# Patient Record
Sex: FEMALE | Race: ASIAN
[De-identification: names, ages, dates, MRNs, and addresses within clinical notes are randomized per-mention and may not be internally consistent; named-entity substitution may affect disease eponyms.]

---

## 2020-07-17 ENCOUNTER — HOSPITAL ENCOUNTER (OUTPATIENT)
Dept: HOSPITAL 74 - JASU-SURG | Age: 50
Discharge: HOME | End: 2020-07-17
Attending: OBSTETRICS & GYNECOLOGY
Payer: COMMERCIAL

## 2020-07-17 VITALS — SYSTOLIC BLOOD PRESSURE: 145 MMHG | HEART RATE: 82 BPM | TEMPERATURE: 97.2 F | DIASTOLIC BLOOD PRESSURE: 50 MMHG

## 2020-07-17 VITALS — BODY MASS INDEX: 26.2 KG/M2

## 2020-07-17 DIAGNOSIS — D64.9: ICD-10-CM

## 2020-07-17 DIAGNOSIS — N92.1: Primary | ICD-10-CM

## 2020-07-17 LAB
ANION GAP SERPL CALC-SCNC: 5 MMOL/L (ref 8–16)
APTT BLD: 29.8 SECONDS (ref 25.2–36.5)
BASOPHILS # BLD: 1 % (ref 0–2)
BUN SERPL-MCNC: 9.2 MG/DL (ref 7–18)
CALCIUM SERPL-MCNC: 9.1 MG/DL (ref 8.5–10.1)
CHLORIDE SERPL-SCNC: 106 MMOL/L (ref 98–107)
CO2 SERPL-SCNC: 27 MMOL/L (ref 21–32)
CREAT SERPL-MCNC: 0.8 MG/DL (ref 0.55–1.3)
DEPRECATED RDW RBC AUTO: 15.6 % (ref 11.6–15.6)
EOSINOPHIL # BLD: 3.4 % (ref 0–4.5)
GLUCOSE SERPL-MCNC: 130 MG/DL (ref 74–106)
HCT VFR BLD CALC: 30.3 % (ref 32.4–45.2)
HGB BLD-MCNC: 10 GM/DL (ref 10.7–15.3)
INR BLD: 0.97 (ref 0.83–1.09)
LYMPHOCYTES # BLD: 27.8 % (ref 8–40)
MCH RBC QN AUTO: 27.8 PG (ref 25.7–33.7)
MCHC RBC AUTO-ENTMCNC: 33.1 G/DL (ref 32–36)
MCV RBC: 83.9 FL (ref 80–96)
MONOCYTES # BLD AUTO: 6.1 % (ref 3.8–10.2)
NEUTROPHILS # BLD: 61.7 % (ref 42.8–82.8)
PLATELET # BLD AUTO: 256 K/MM3 (ref 134–434)
PMV BLD: 8.8 FL (ref 7.5–11.1)
POTASSIUM SERPLBLD-SCNC: 4.2 MMOL/L (ref 3.5–5.1)
PT PNL PPP: 11.5 SEC (ref 9.7–13)
RBC # BLD AUTO: 3.61 M/MM3 (ref 3.6–5.2)
SODIUM SERPL-SCNC: 139 MMOL/L (ref 136–145)
WBC # BLD AUTO: 4.9 K/MM3 (ref 4–10)

## 2020-07-17 PROCEDURE — 0U5B8ZZ DESTRUCTION OF ENDOMETRIUM, VIA NATURAL OR ARTIFICIAL OPENING ENDOSCOPIC: ICD-10-PCS | Performed by: OBSTETRICS & GYNECOLOGY

## 2020-07-17 PROCEDURE — 0UDB7ZX EXTRACTION OF ENDOMETRIUM, VIA NATURAL OR ARTIFICIAL OPENING, DIAGNOSTIC: ICD-10-PCS | Performed by: OBSTETRICS & GYNECOLOGY

## 2020-07-17 NOTE — HP
History & Physical Update





- Physical


Physical: No Change





- Assessment


Assessment: No Change





- Plan


Plan: No Change (H&P reviwed, no changes , for hysteroscopic EM hydroablation, 

D&C)

## 2020-07-17 NOTE — OP
Operative Note





- Note:


Operative Date: 07/17/20


Pre-Operative Diagnosis: menometrorrhagia, anemia


Operation: hysteroscopy , D&C, EM hydroablation


Findings: 





enlarged uterus , possible adenomyosis, irregular thick EM


Surgeon: Rajesh Brady


Anesthesia: General


Specimens Removed: EMC


Estimated Blood Loss (mls): 25


Drains & Tubes with Location: none


Blood Volume Replaced (mls): 0


Operative Report Dictated: Yes

## 2020-07-22 NOTE — OP
DATE OF OPERATION:  07/17/2020

 

PREOPERATIVE DIAGNOSIS:  Menometrorrhagia, anemia.

 

POSTOPERATIVE DIAGNOSIS:  Menometrorrhagia, anemia.

 

PROCEDURE:  Hysteroscopy, dilation and curettage and endometrial hydro-ablation.

 

SURGEON:  Rajesh Brady MD

 

ANESTHESIA:  General.

 

ESTIMATED BLOOD LOSS:  25 mL.

 

OPERATIVE REPORT:  Patient was taken to the operating room.  Under adequate general

anesthesia in the dorsal lithotomy position examination under anesthesia revealed

external genitalia to be normal.  Vagina was normal.  Cervix was clean.  No gross

lesion.  Uterus was prominent, anteverted.  No masses were palpable.  Adnexa:  No

masses were palpable.  Then with a weighted speculum in the vagina, anterior lip of

the cervix was grasped with single-tooth tenaculum.  Uterine cavity was sounded to 9

cm.  Cervix was slightly dilated with Hegar dilator and then endometrial curetting

was done first.  Hysteroscope was introduced.  Visualization of the endocervical

canal appeared to be normal.  Endometrium was irregular and patchy.  Both cornual

regions were identified.  Then, endometrial hydro-ablation done according to the

company's protocol and patient tolerated procedure well, left the OR in good

condition.  No leak.  No complication.  

 

 

RAJESH BRADY M.D.

 

/[id number]

DD: 07/22/2020 10:29

DT: 07/22/2020 14:31

Job #:  [id number]

## 2020-07-22 NOTE — PATH
Surgical Pathology Report



Patient Name:  ADRIANA GOETZ

Accession #:  [id number]

Med. Rec. #:  [id number]                                                        

   /Age/Gender:  1970 (Age: 50) / F

Account:  [id number]                                                          

             Location: [facility] SURGICAL

Taken:  2020

Received:  2020

Reported:  2020

Physicians:  Rajesh Brady M.D.

  



Specimen(s) Received

 ENDOMETRIAL CURETTINGS 





Clinical History

Menometrorrhagia







Final Diagnosis

ENDOMETRIAL CURETTINGS, DILATION AND CURETTAGE:

FRAGMENTS OF SECRETORY ENDOMETRIUM WITH GLANDULAR AND STROMAL BREAKDOWN ADMIXED

WITH BLOOD.

FRAGMENTS OF BENIGN CERVICAL TISSUE.





***Electronically Signed***

Linda Irene M.D.





Gross Description

Received in formalin labeled "endometrial curettings," is a 3.3 x 2.3 x 0.3 cm

aggregate of tan-brown soft tissue fragments admixed with blood clot. The

formalin is filtered and the specimen is entirely submitted in 2 cassettes.

/2020



[place]audi/[email or web address]